# Patient Record
Sex: MALE | Race: WHITE | NOT HISPANIC OR LATINO | URBAN - METROPOLITAN AREA
[De-identification: names, ages, dates, MRNs, and addresses within clinical notes are randomized per-mention and may not be internally consistent; named-entity substitution may affect disease eponyms.]

---

## 2017-11-03 ENCOUNTER — OUTPATIENT (OUTPATIENT)
Dept: OUTPATIENT SERVICES | Age: 32
LOS: 1 days | Discharge: HOME | End: 2017-11-03

## 2017-11-06 ENCOUNTER — APPOINTMENT (OUTPATIENT)
Dept: PEDIATRIC CARDIOLOGY | Facility: CLINIC | Age: 32
End: 2017-11-06
Payer: COMMERCIAL

## 2017-11-06 VITALS
OXYGEN SATURATION: 100 % | DIASTOLIC BLOOD PRESSURE: 72 MMHG | WEIGHT: 141.1 LBS | BODY MASS INDEX: 24.09 KG/M2 | HEART RATE: 62 BPM | SYSTOLIC BLOOD PRESSURE: 119 MMHG | HEIGHT: 63.98 IN

## 2017-11-06 PROCEDURE — 93000 ELECTROCARDIOGRAM COMPLETE: CPT

## 2017-11-06 PROCEDURE — 99215 OFFICE O/P EST HI 40 MIN: CPT | Mod: 25

## 2017-11-06 PROCEDURE — 93320 DOPPLER ECHO COMPLETE: CPT

## 2017-11-06 PROCEDURE — 93303 ECHO TRANSTHORACIC: CPT

## 2017-11-06 PROCEDURE — 93325 DOPPLER ECHO COLOR FLOW MAPG: CPT

## 2018-09-28 ENCOUNTER — APPOINTMENT (OUTPATIENT)
Dept: PEDIATRIC CARDIOLOGY | Facility: CLINIC | Age: 33
End: 2018-09-28
Payer: COMMERCIAL

## 2018-09-28 VITALS
RESPIRATION RATE: 16 BRPM | OXYGEN SATURATION: 100 % | SYSTOLIC BLOOD PRESSURE: 104 MMHG | DIASTOLIC BLOOD PRESSURE: 65 MMHG | WEIGHT: 136.25 LBS | BODY MASS INDEX: 23.55 KG/M2 | HEART RATE: 54 BPM | HEIGHT: 63.78 IN

## 2018-09-28 PROCEDURE — 93320 DOPPLER ECHO COMPLETE: CPT

## 2018-09-28 PROCEDURE — 93325 DOPPLER ECHO COLOR FLOW MAPG: CPT

## 2018-09-28 PROCEDURE — 93303 ECHO TRANSTHORACIC: CPT

## 2018-10-03 ENCOUNTER — APPOINTMENT (OUTPATIENT)
Dept: PULMONOLOGY | Facility: CLINIC | Age: 33
End: 2018-10-03
Payer: COMMERCIAL

## 2018-10-03 VITALS
SYSTOLIC BLOOD PRESSURE: 123 MMHG | RESPIRATION RATE: 16 BRPM | HEART RATE: 76 BPM | WEIGHT: 133 LBS | HEIGHT: 63.78 IN | DIASTOLIC BLOOD PRESSURE: 75 MMHG | TEMPERATURE: 98 F | BODY MASS INDEX: 22.99 KG/M2 | OXYGEN SATURATION: 98 %

## 2018-10-03 DIAGNOSIS — G47.33 OBSTRUCTIVE SLEEP APNEA (ADULT) (PEDIATRIC): ICD-10-CM

## 2018-10-03 PROCEDURE — 99244 OFF/OP CNSLTJ NEW/EST MOD 40: CPT

## 2018-11-12 ENCOUNTER — APPOINTMENT (OUTPATIENT)
Dept: SLEEP CENTER | Facility: CLINIC | Age: 33
End: 2018-11-12
Payer: COMMERCIAL

## 2018-11-12 ENCOUNTER — OUTPATIENT (OUTPATIENT)
Dept: OUTPATIENT SERVICES | Facility: HOSPITAL | Age: 33
LOS: 1 days | End: 2018-11-12
Payer: COMMERCIAL

## 2018-11-12 PROCEDURE — G0399: CPT | Mod: 26

## 2018-11-12 PROCEDURE — G0399: CPT

## 2018-11-16 DIAGNOSIS — G47.33 OBSTRUCTIVE SLEEP APNEA (ADULT) (PEDIATRIC): ICD-10-CM

## 2019-10-25 ENCOUNTER — APPOINTMENT (OUTPATIENT)
Dept: PEDIATRIC CARDIOLOGY | Facility: CLINIC | Age: 34
End: 2019-10-25
Payer: COMMERCIAL

## 2019-10-25 VITALS
BODY MASS INDEX: 22.14 KG/M2 | HEART RATE: 62 BPM | RESPIRATION RATE: 20 BRPM | HEIGHT: 63.78 IN | OXYGEN SATURATION: 98 % | DIASTOLIC BLOOD PRESSURE: 68 MMHG | SYSTOLIC BLOOD PRESSURE: 119 MMHG | WEIGHT: 128.09 LBS

## 2019-10-25 PROCEDURE — 93320 DOPPLER ECHO COMPLETE: CPT

## 2019-10-25 PROCEDURE — 93325 DOPPLER ECHO COLOR FLOW MAPG: CPT

## 2019-10-25 PROCEDURE — 93303 ECHO TRANSTHORACIC: CPT

## 2019-10-29 NOTE — CONSULT LETTER
[Today's Date] : [unfilled] [] : : ~~ [Name] : Name: [unfilled] [Today's Date:] : [unfilled] [Dear  ___:] : Dear Dr. [unfilled]: [Consult] : I had the pleasure of evaluating your patient, [unfilled]. My full evaluation follows. [Consult - Multiple Provider] : Thank you very much for allowing us to participate in the care of this patient. If you have any questions, please do not hesitate to contact us. [Sincerely,] : Sincerely, [FreeTextEntry4] : Dr. Jaki Lam [FreeTextEntry5] : [address] [FreeTextEntry6] : [place]  [address] [de-identified] : Flora Gibbons, MSN, CPNP-AC, PC\par Pediatric Cardiology, Adult Congenital Cardiology\par [facility]\par \par Joe Padilla MD\par Pediatric Cardiology\par Adult Congenital Heart Disease\par  of Pediatrics\par [employer] [place] [facility] [employer] [facility]\par \par Flora Gibbons MSN, CPNP-AC, PC\par Pediatric Cardiology, Adult Congenital Cardiology\par [facility]\par

## 2019-10-29 NOTE — HISTORY OF PRESENT ILLNESS
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 His surgery was done by Dr. Mao in 1986 at [facility]. As you know, Kevon also carries a diagnosis of Trisomy 21.\par \par Kevon cardiac interim history is unremarkable. he continues to attend his day program without any limitations. The cardiovascular review of systems is unremarkable. Specifically, there are no complaints of chest pain, palpitations, shortness of breath, peripheral edema, dizziness or syncope. He underwent a sleep study following his near syncopal event last year which was negative for any obstructive sleep apnea. He has routine blood work which was normal with the exception of markers for celiac disease, which runs in the family.  He is pending a potential EGD with biopsies.\par \par He continues on Synthroid fro his history of hypothyroidism. He gets routine dental care and received an annual flu shot for the upcoming flu season.His mother maintains legal guardianship of him.

## 2019-10-29 NOTE — REVIEW OF SYSTEMS
[Feeling Poorly] : not feeling poorly (malaise) [Cyanosis] : no cyanosis [Edema] : no edema [Chest Pain] : no chest pain or discomfort [Exercise Intolerance] : no persistence of exercise intolerance [Palpitations] : no palpitations [Fast HR] : no tachycardia [Tachypnea] : not tachypneic [Wheezing] : no wheezing [Shortness Of Breath] : not expressed as feeling short of breath [Abdominal Pain] : no abdominal pain [Fainting (Syncope)] : no fainting [Headache] : no headache [Dizziness] : no dizziness [Easy Bruising] : no tendency for easy bruising [Easy Bleeding] : no ~M tendency for easy bleeding [Sleep Disturbances] : ~T no sleep disturbances [Depression] : no depression [Anxiety] : no anxiety [Heat/Cold Intolerance] : no temperature intolerance

## 2019-10-29 NOTE — PHYSICAL EXAM
[General Appearance - Alert] : alert [General Appearance - In No Acute Distress] : in no acute distress [General Appearance - Well Developed] : well developed [General Appearance - Well-Appearing] : well appearing [Facies] : the head and face were normal in appearance [Down Syndrome] : Down Syndrome [Sclera] : the conjunctiva were normal [Examination Of The Oral Cavity] : mucous membranes were moist and pink [Respiration, Rhythm And Depth] : normal respiratory rhythm and effort [Auscultation Breath Sounds / Voice Sounds] : breath sounds clear to auscultation bilaterally [No Cough] : no cough [Sternotomy] : sternotomy [Apical Impulse] : quiet precordium with normal apical impulse [Heart Rate And Rhythm] : normal heart rate and rhythm [Heart Sounds] : normal S1 and S2 [Heart Sounds Gallop] : no gallops [Heart Sounds Pericardial Friction Rub] : no pericardial rub [Heart Sounds Click] : no clicks [Arterial Pulses] : normal upper and lower extremity pulses with no pulse delay [Edema] : no edema [Capillary Refill Test] : normal capillary refill [Systolic] : systolic [II] : a grade 2/6 [LMSB] : LMSB  [Holosystolic] : holosystolic [Bowel Sounds] : normal bowel sounds [Abdomen Soft] : soft [Nondistended] : nondistended [Abdomen Tenderness] : non-tender [] : no hepato-splenomegaly [Musculoskeletal - Swelling] : no joint swelling seen [Nail Clubbing] : no clubbing  or cyanosis of the fingers [Skin Color & Pigmentation] : normal skin color and pigmentation [Demonstrated Behavior - Infant Nonreactive To Parents] : interactive [FreeTextEntry1] : tattoos to both arms

## 2019-10-29 NOTE — CARDIOLOGY SUMMARY
[Today's Date] : [unfilled] [FreeTextEntry1] : normal sinus rhythm\par right bundle branch block (QRSd 124 ms)\par left axis deviation\par minimal voltage criteria for LVH\par when compared to prior, no significant change is seen [FreeTextEntry2] : mild right and mild left atrioventricular valve regurgitation\par estimated RV pressures ~ 37 mmHg\par trivial peripatch VSD\par mild dilated aortic root, mild AI\par mild PS- peak 22/mean 14 mmHg\par normal biventricular function

## 2020-02-15 ENCOUNTER — APPOINTMENT (OUTPATIENT)
Dept: ORTHOPEDIC SURGERY | Facility: CLINIC | Age: 35
End: 2020-02-15

## 2020-02-20 ENCOUNTER — APPOINTMENT (OUTPATIENT)
Dept: NEUROSURGERY | Facility: CLINIC | Age: 35
End: 2020-02-20
Payer: COMMERCIAL

## 2020-02-20 VITALS
SYSTOLIC BLOOD PRESSURE: 118 MMHG | HEIGHT: 64 IN | BODY MASS INDEX: 22.36 KG/M2 | DIASTOLIC BLOOD PRESSURE: 64 MMHG | WEIGHT: 131 LBS | HEART RATE: 63 BPM | OXYGEN SATURATION: 98 %

## 2020-02-20 DIAGNOSIS — M53.2X1 SPINAL INSTABILITIES, OCCIPITO-ATLANTO-AXIAL REGION: ICD-10-CM

## 2020-02-20 PROCEDURE — 99204 OFFICE O/P NEW MOD 45 MIN: CPT

## 2020-02-21 PROBLEM — M53.2X1 ATLANTO-AXIAL INSTABILITY: Status: ACTIVE | Noted: 2020-02-21

## 2020-02-21 RX ORDER — LEVOTHYROXINE SODIUM 0.05 MG/1
50 TABLET ORAL
Refills: 0 | Status: ACTIVE | COMMUNITY

## 2020-02-26 NOTE — HISTORY OF PRESENT ILLNESS
[> 3 months] : more  than 3 months [FreeTextEntry1] : C1-2 abnormality on x-ray [de-identified] : Mr. 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  The patient denies neck pain.  Per his mother, he has never complained of neck pain.  He does not have numbness, tingling, or weakness of the extremities.  He is able to ambulate steadily and has not had any unexplained falls.

## 2020-02-26 NOTE — PHYSICAL EXAM
[General Appearance - Alert] : alert [General Appearance - In No Acute Distress] : in no acute distress [General Appearance - Well Nourished] : well nourished [Oriented To Time, Place, And Person] : oriented to person, place, and time [Impaired Insight] : insight and judgment were intact [Affect] : the affect was normal [Person] : oriented to person [Place] : oriented to place [Time] : oriented to time [Short Term Intact] : short term memory intact [Concentration Intact] : normal concentrating ability [Fluency] : fluency intact [Comprehension] : comprehension intact [Cranial Nerves Optic (II)] : visual acuity intact bilaterally,  pupils equal round and reactive to light [Cranial Nerves Oculomotor (III)] : extraocular motion intact [Cranial Nerves Facial (VII)] : face symmetrical [Cranial Nerves Trigeminal (V)] : facial sensation intact symmetrically [Cranial Nerves Glossopharyngeal (IX)] : tongue and palate midline [Cranial Nerves Accessory (XI - Cranial And Spinal)] : head turning and shoulder shrug symmetric [Cranial Nerves Hypoglossal (XII)] : there was no tongue deviation with protrusion [Sensation Tactile Decrease] : light touch was intact [Motor Strength] : muscle strength was normal in all four extremities [Motor Tone] : muscle tone was normal in all four extremities [Abnormal Walk] : normal gait [Sensation Pain / Temperature Decrease] : pain and temperature was intact [Balance] : balance was intact [No Visual Abnormalities] : no visible abnormailities [Normal] : normal [Over the Past 2 Weeks, Have You Felt Down, Depressed, or Hopeless?] : 1.) Over the past 2 weeks, have you felt down, depressed, or hopeless? No [Over the Past 2 Weeks, Have You Felt Little Interest or Pleasure Doing Things?] : 2.) Over the past 2 weeks, have you felt little interest or pleasure doing things? No [___] : absent on the right [___] : absent on the left [FreeTextEntry9] : no Olaf's

## 2020-02-26 NOTE — DATA REVIEWED
[de-identified] : X-ray of the cervical spine shows alteration of the normal C1-2 alignment.  An official report is not available for review.

## 2020-02-26 NOTE — ASSESSMENT
[FreeTextEntry1] : Mr. Shetty presents for initial neurosurgical evaluation with his mother.  I have explained that an MRI, CT, and flexion-extension images would likely be necessary to risk stratify the patient for endoscopy from a neurosurgical/cervical spine perspective.  The patient's mother does not wish to pursue endoscopy and intends to switch her son to a gluten free diet.  I would be happy to see the patient back on a prn basis.  His mother knows to call the office with any new or concerning symptoms at any time.

## 2020-02-26 NOTE — REASON FOR VISIT
[New Patient Visit] : a new patient visit [Parent] : parent [Referred By: _________] : Patient was referred by CARLINE [FreeTextEntry1] : atlantoaxial instability

## 2020-04-10 ENCOUNTER — APPOINTMENT (OUTPATIENT)
Dept: GASTROENTEROLOGY | Facility: CLINIC | Age: 35
End: 2020-04-10

## 2020-07-01 ENCOUNTER — APPOINTMENT (OUTPATIENT)
Dept: GASTROENTEROLOGY | Facility: CLINIC | Age: 35
End: 2020-07-01

## 2020-11-06 DIAGNOSIS — Z98.890 OTHER SPECIFIED POSTPROCEDURAL STATES: ICD-10-CM

## 2020-11-13 ENCOUNTER — APPOINTMENT (OUTPATIENT)
Dept: PEDIATRIC CARDIOLOGY | Facility: CLINIC | Age: 35
End: 2020-11-13
Payer: COMMERCIAL

## 2020-11-13 VITALS
RESPIRATION RATE: 20 BRPM | SYSTOLIC BLOOD PRESSURE: 125 MMHG | HEART RATE: 62 BPM | HEIGHT: 62.99 IN | DIASTOLIC BLOOD PRESSURE: 81 MMHG | OXYGEN SATURATION: 99 % | WEIGHT: 123.68 LBS | BODY MASS INDEX: 21.91 KG/M2

## 2020-11-13 PROCEDURE — 93325 DOPPLER ECHO COLOR FLOW MAPG: CPT

## 2020-11-13 PROCEDURE — 93320 DOPPLER ECHO COMPLETE: CPT

## 2020-11-13 PROCEDURE — 93303 ECHO TRANSTHORACIC: CPT

## 2020-11-13 PROCEDURE — 99072 ADDL SUPL MATRL&STAF TM PHE: CPT

## 2020-11-16 NOTE — DISCUSSION/SUMMARY
[Needs SBE Prophylaxis] : [unfilled]  needs bacterial endocarditis prophylaxis. SBE prophylaxis is indicated for dental and invasive ENT procedures. (Circulation. 2007; 116: 6499-0282) [Influenza vaccine is recommended] : Influenza vaccine is recommended [FreeTextEntry1] : In summary, Kevon is a 35 year old with Trisomy 21 and a complete AV canal for which he underwent complete repair at age 1 years.  His echocardiograms continues to show a durable repair.  There is no residual atrial communication, a trivial sue-patch VSD with a 64 mmHg, and mild right/left atrioventricular valve regurgitation. He is clinically unchanged from his visit one year ago. He has gotten his flu shot and has a dental appointment coming up next week.  He is having blood work done next week to flow up on his lipid panel and will send those results so we may scan them into our chart for our records.\par \par We will continue to follow him annually but remain available to see him sooner if any clinical concerns arise.

## 2020-11-16 NOTE — HISTORY OF PRESENT ILLNESS
[FreeTextEntry1] : We had the pleasure of seeing Kevon for follow up in the [facility] of [facility] on November 13, 2020. 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 Mao. Kevon also carries a diagnosis of Trisomy 21.\par \par Kevon has done well during COVID. His day program had closed during COVID and he never went back. He has kept busy with drawing and walking on the treadmill 30 minutes 3-4 times weekly. From a cardiovascular standpoint his cardiovascular history is unremarkable. his cardiovascular review of systems is completely unremarkable. Specifically, he has no complaints of chest pain, palpitations, shortness of breath, peripheral edema, dizziness or syncope.\par Specifically, he has no complaints of chest pain, palpitations, shortness of breath, peripheral edema, dizziness or syncope.\par \par He remains on Synthroid. He had labs done that revealed he had Celiac Disease and that has been managed with a Gluten Free diet.\par \par \par

## 2020-11-16 NOTE — REASON FOR VISIT
[Follow-Up] : a follow-up visit for [Complete Atrioventricular Canal] : a complete atrioventricular canal [Trisomy 21 (Down Syndrome)] : Trisomy 21  [Mother] : mother [FreeTextEntry3] : s/p AV canal repair

## 2020-11-16 NOTE — CARDIOLOGY SUMMARY
[Today's Date] : [unfilled] [FreeTextEntry1] : NSR, ventricular rate 62 bpm, RBBB, left axis [FreeTextEntry2] : Summary:  \par 1.Complete atrioventricular canal defect.\par 2.Status post complete common atrioventricular valve canal repair.\par 3.No residual interatrial shunt identified.\par 4.Mild right and mild to moderate left atrioventricular valve regurgitation.\par 5.Small sue-patch ventricular septal defect identified.\par 6.The tricuspid regurgitation gradient suggests mildly elevated right ventricular pressures.\par 7.Right ventricular systolic pressure estimate = 35.5 mmHg (estimated right atrial pressure of 5 mmHg).\par 8.Qualitatively normal left ventricular systolic function.\par 9.Mild pulmonary valve stenosis.\par 10.Peak pulmonary valve gradient = 24.2 mmHg (mean grad = 18.0 mmHg).\par 11.Trivial pulmonary valve regurgitation.\par 12.Mildly dilated aortic root.\par 13.Dilated aortic sinotubular junction.\par 14.Trivial aortic valve regurgitation.\par 15.No pericardial effusion\par

## 2020-11-16 NOTE — CONSULT LETTER
[Consult - Multiple Provider] : Thank you very much for allowing us to participate in the care of this patient. If you have any questions, please do not hesitate to contact us. [Sincerely,] : Sincerely, [Today's Date] : [unfilled] [Name] : Name: [unfilled] [] : : ~~ [Today's Date:] : [unfilled] [Dear  ___:] : Dear Dr. [unfilled]: [Consult] : I had the pleasure of evaluating your patient, [unfilled]. My full evaluation follows. [FreeTextEntry4] : Dr. Jaki Lam [FreeTextEntry5] : [address] [FreeTextEntry6] : [place]  [address] [de-identified] : Flora Gibbons, MSN, CPNP-AC, PC\par Pediatric Cardiology, Adult Congenital Cardiology\par [facility]\par \par Kris Ceelste MD, TAM\par Medical Director, Adult Congenital Heart Program\par Professor of Pediatrics\par [employer] [facility] [employer] [facility]\par

## 2020-11-16 NOTE — REVIEW OF SYSTEMS
[Feeling Poorly] : not feeling poorly (malaise) [Fever] : no fever [Cyanosis] : no cyanosis [Edema] : no edema [Exercise Intolerance] : no persistence of exercise intolerance [Fast HR] : no tachycardia [Cough] : no cough [Shortness Of Breath] : not expressed as feeling short of breath [Fainting (Syncope)] : no fainting [Headache] : no headache [Dizziness] : no dizziness [Easy Bruising] : no tendency for easy bruising [Easy Bleeding] : no ~M tendency for easy bleeding [Sleep Disturbances] : ~T no sleep disturbances [Anxiety] : no anxiety

## 2020-11-16 NOTE — PHYSICAL EXAM
[General Appearance - Alert] : alert [General Appearance - Well Nourished] : well nourished [Down Syndrome] : Down Syndrome [Sclera] : the conjunctiva were normal [Auscultation Breath Sounds / Voice Sounds] : breath sounds clear to auscultation bilaterally [No Cough] : no cough [Sternotomy] : sternotomy [Well-Healed] : well-healed [Unremarkable] : unremarkable [Apical Impulse] : quiet precordium with normal apical impulse [Heart Rate And Rhythm] : normal heart rate and rhythm [Heart Sounds] : normal S1 and S2 [2+] : right 2+ [1+] : left 1+ [___ +] : [unfilled]U+ pitting edema to L ankle [Systolic] : systolic [II] : a grade 2/6 [LLSB] : LLSB  [LMSB] : LMSB  [Holosystolic] : holosystolic [Blowing] : blowing [[place]] : the murmur was transmitted to the apex [Diastolic] : diastolic [Abdomen Soft] : soft [] : no hepato-splenomegaly [Nail Clubbing] : no clubbing  or cyanosis of the fingers [Musculoskeletal Exam: Normal Movement Of All Extremities] : normal movements of all extremities [Normal Station and Gait] : the gait and station were normal for the patient's age [Cervical Lymph Nodes Enlarged Anterior] : The anterior cervical nodes were normal [Cervical Lymph Nodes Enlarged Posterior] : The posterior cervical nodes were normal [Skin Turgor] : normal turgor [Demonstrated Behavior - Infant Nonreactive To Parents] : interactive [FreeTextEntry1] : I/IV diastolic rumble at LLSB

## 2021-12-17 ENCOUNTER — APPOINTMENT (OUTPATIENT)
Dept: PEDIATRIC CARDIOLOGY | Facility: CLINIC | Age: 36
End: 2021-12-17
Payer: MEDICAID

## 2021-12-17 VITALS
WEIGHT: 122.58 LBS | SYSTOLIC BLOOD PRESSURE: 102 MMHG | HEART RATE: 62 BPM | OXYGEN SATURATION: 100 % | RESPIRATION RATE: 20 BRPM | BODY MASS INDEX: 21.99 KG/M2 | DIASTOLIC BLOOD PRESSURE: 55 MMHG | HEIGHT: 62.6 IN

## 2021-12-17 PROCEDURE — 93303 ECHO TRANSTHORACIC: CPT

## 2021-12-17 PROCEDURE — 99214 OFFICE O/P EST MOD 30 MIN: CPT

## 2021-12-17 PROCEDURE — 93000 ELECTROCARDIOGRAM COMPLETE: CPT

## 2021-12-17 PROCEDURE — 93320 DOPPLER ECHO COMPLETE: CPT

## 2021-12-17 PROCEDURE — 93325 DOPPLER ECHO COLOR FLOW MAPG: CPT

## 2021-12-17 NOTE — PHYSICAL EXAM
[General Appearance - Alert] : alert [Down Syndrome] : Down Syndrome [Sclera] : the sclera were normal [Examination Of The Oral Cavity] : mucous membranes were moist and pink [Auscultation Breath Sounds / Voice Sounds] : breath sounds clear to auscultation bilaterally [No Cough] : no cough [Well-Healed] : well-healed [Unremarkable] : unremarkable [Apical Impulse] : quiet precordium with normal apical impulse [Heart Rate And Rhythm] : normal heart rate and rhythm [Heart Sounds] : normal S1 and S2 [Edema] : no edema [Systolic] : systolic [LMSB] : LMSB  [[place]] : the murmur was transmitted to the apex [Nail Clubbing] : no clubbing  or cyanosis of the fingers [Motor Tone] : normal muscle strength and tone [Skin Color & Pigmentation] : normal skin color and pigmentation [Demonstrated Behavior - Infant Nonreactive To Parents] : interactive [III] : a grade 3/6   [Diastolic] : diastolic [I] : a grade 1/4  [LLSB] : LLSB  [Rumbling] : rumbling [Abdomen Soft] : soft [Palpable___cm below the RCM] : palpable [unfilled] cm below the right costal margin

## 2021-12-21 NOTE — DISCUSSION/SUMMARY
[FreeTextEntry1] : In summary, Kevon is a 36 year old with Trisomy 21 and a complete AV canal for which he underwent complete repair at age 1 years. His echocardiogram remains unchanged, demonstrating  a durable repair. There is no residual atrial communication, a trivial restrictive sue-patch VSD, and mild right/left atrioventricular valve regurgitation. He is clinically unchanged from his visit one year ago. \par \par We will continue to follow him annually but remain available to see him sooner if any clinical concerns arise [Needs SBE Prophylaxis] : [unfilled] does not need bacterial endocarditis prophylaxis

## 2021-12-21 NOTE — HISTORY OF PRESENT ILLNESS
[FreeTextEntry1] : We had the pleasure of seeing Kevon for follow up in the Adult [facility] Program of [facility] on December 17, 2021. Kevon is a 36 year old male with a complete common AV canal who underwent complete repair in 1986 by Dr. Mao. Kevon also carries a diagnosis of Trisomy 21.\par \par Kevon has done well during COVID. He has been vaccinated with the Pfizer vaccine. He has not had the booster. His parents have retired and so he has "retired" from his day program as well. He has kept busy with family,  drawing and walking on the treadmill 30 minutes 3-4 times weekly. From a cardiovascular standpoint his cardiovascular history is unremarkable.\par \par His cardiovascular review of systems is completely unremarkable. Specifically, he has no complaints of chest pain, palpitations, shortness of breath, peripheral edema, dizziness or syncope. Specifically, he has no complaints of chest pain, palpitations, shortness of breath, peripheral edema, dizziness or syncope.\par \par He remains on Synthroid. His Celiac Disease  has been managed with a Gluten Free diet.

## 2021-12-21 NOTE — CARDIOLOGY SUMMARY
[Today's Date] : [unfilled] [FreeTextEntry1] : Normal sinus rhythm\par Left anterior fascicular block\par Right bundle branch block [FreeTextEntry2] : Summary:\par 1. Complete atrioventricular canal defect.\par 2. Status post complete common atrioventricular valve canal repair.\par 3. No residual interatrial shunt identified.\par 4. Mild right and moderate left atrioventricular valve regurgitation.\par 5. Mild pulmonary valve stenosis.\par 6. Trivial pulmonary valve regurgitation.\par 7. Right ventricular systolic pressure estimate = 40.4 mmHg (estimated right atrial pressure of 5 mmHg).\par 8. The tricuspid regurgitation gradient suggests mildly elevated right ventricular pressures.\par 9. Qualitatively normal left ventricular systolic function.\par 10. Normal right ventricular morphology.\par 11. Qualitatively normal right ventricular systolic function.\par 12. Small peripatch VSD with left to right flow and a peak gradient of ~58 mm Hg (incomplete Doppler).\par 13. Aortic valve morphology was not well visualized.\par 14. Trivial aortic valve regurgitation.\par 15. Dilated aortic sinotubular junction.\par 16. Mildly dilated aortic root.\par 17. No pericardial effusion\par

## 2021-12-21 NOTE — CONSULT LETTER
[Today's Date] : [unfilled] [Name] : Name: [unfilled] [] : : ~~ [Today's Date:] : [unfilled] [Dear  ___:] : Dear Dr. [unfilled]: [Consult] : I had the pleasure of evaluating your patient, [unfilled]. My full evaluation follows. [Consult - Multiple Provider] : Thank you very much for allowing us to participate in the care of this patient. If you have any questions, please do not hesitate to contact us. [Sincerely,] : Sincerely, [FreeTextEntry4] : Dr. Jaki Lam [FreeTextEntry5] : [address] [FreeTextEntry6] : [place]  [address] [de-identified] : Flora Gibbons, MSN, CPNP-AC, PC\par Pediatric Cardiology, Adult Congenital Cardiology\par [facility]\par [facility]\par \par Kris Celeste MD, TAM\par Medical Director, Adult Congenital Heart Program\par Professor of Pediatrics\par [employer] [facility] [employer] at [facility]\par

## 2021-12-21 NOTE — REVIEW OF SYSTEMS
[Feeling Poorly] : not feeling poorly (malaise) [Cyanosis] : no cyanosis [Exercise Intolerance] : no persistence of exercise intolerance [Palpitations] : no palpitations [Orthopnea] : no orthopnea [Fast HR] : no tachycardia [Cough] : no cough [Shortness Of Breath] : not expressed as feeling short of breath [Decrease In Appetite] : appetite not decreased [Fainting (Syncope)] : no fainting [Headache] : no headache [Dizziness] : no dizziness [Easy Bruising] : no tendency for easy bruising [Easy Bleeding] : no ~M tendency for easy bleeding [Sleep Disturbances] : ~T no sleep disturbances [Depression] : no depression [Anxiety] : no anxiety [Heat/Cold Intolerance] : no temperature intolerance

## 2022-05-30 ENCOUNTER — NON-APPOINTMENT (OUTPATIENT)
Age: 37
End: 2022-05-30

## 2022-12-16 ENCOUNTER — APPOINTMENT (OUTPATIENT)
Dept: PEDIATRIC CARDIOLOGY | Facility: CLINIC | Age: 37
End: 2022-12-16
Payer: MEDICARE

## 2022-12-16 VITALS
WEIGHT: 120.15 LBS | SYSTOLIC BLOOD PRESSURE: 98 MMHG | OXYGEN SATURATION: 98 % | DIASTOLIC BLOOD PRESSURE: 62 MMHG | RESPIRATION RATE: 20 BRPM | BODY MASS INDEX: 21.56 KG/M2 | HEIGHT: 62.6 IN | HEART RATE: 63 BPM

## 2022-12-16 PROCEDURE — 93325 DOPPLER ECHO COLOR FLOW MAPG: CPT

## 2022-12-16 PROCEDURE — 93320 DOPPLER ECHO COMPLETE: CPT

## 2022-12-16 PROCEDURE — 99213 OFFICE O/P EST LOW 20 MIN: CPT | Mod: 25

## 2022-12-16 PROCEDURE — 93000 ELECTROCARDIOGRAM COMPLETE: CPT

## 2022-12-16 PROCEDURE — 93303 ECHO TRANSTHORACIC: CPT

## 2022-12-16 NOTE — PHYSICAL EXAM
[General Appearance - Alert] : alert [Down Syndrome] : Down Syndrome [Sclera] : the sclera were normal [Auscultation Breath Sounds / Voice Sounds] : breath sounds clear to auscultation bilaterally [Sternotomy] : sternotomy [Well-Healed] : well-healed [Apical Impulse] : quiet precordium with normal apical impulse [Heart Rate And Rhythm] : normal heart rate and rhythm [Heart Sounds] : normal S1 and S2 [Systolic] : systolic [III] : a grade 3/6   [LMSB] : LMSB  [[place]] : the murmur was transmitted to the apex [Diastolic] : diastolic [I] : a grade 1/4  [LLSB] : LLSB  [Abdomen Soft] : soft [] : no hepato-splenomegaly [Palpable___cm below the RCM] : palpable [unfilled] cm below the right costal margin [Nail Clubbing] : no clubbing  or cyanosis of the fingers [Skin Color & Pigmentation] : normal skin color and pigmentation [Demonstrated Behavior - Infant Nonreactive To Parents] : interactive

## 2022-12-16 NOTE — REASON FOR VISIT
[Follow-Up] : a follow-up visit for [Complete Atrioventricular Canal] : a complete atrioventricular canal [Patient] : patient [Mother] : mother

## 2022-12-22 NOTE — DISCUSSION/SUMMARY
[FreeTextEntry1] : In summary, Kevon is a 37 year old with Trisomy 21 and a complete AV canal for which he underwent complete repair at age 1 years. His echocardiogram remains unchanged, demonstrating a durable repair. There is no residual atrial communication, a trivial restrictive sue-patch VSD, and mild right/left atrioventricular valve regurgitation. He is clinically unchanged from his visit one year ago. \par \par We will continue to follow him annually but remain available to see him sooner if any clinical concerns arise  [Needs SBE Prophylaxis] : [unfilled] does not need bacterial endocarditis prophylaxis [PE + No Varsity Sports or Strenuous Activity] : [unfilled] may participate in the physical education program, WITH RESTRICTION from all varsity sports and from excessively stressful activities such as rope climbing, weight lifting, sustained running (i.e. laps) and fitness testing. Must be allowed to rest when tired. [Influenza vaccine is recommended] : Influenza vaccine is recommended

## 2022-12-22 NOTE — CARDIOLOGY SUMMARY
[Today's Date] : [unfilled] [FreeTextEntry1] : NSR, ventricular rate 63 bpm, RBBB, left anterior fascicular block [FreeTextEntry2] : Summary:\par 1. Complete atrioventricular canal defect.\par 2. Status post complete common atrioventricular valve canal repair.\par 3. Mild right and moderate left atrioventricular valve regurgitation.\par 4. No residual interatrial shunt identified.\par 5. Small peripatch VSD with left to right flow and a peak gradient of ~58 mm Hg (incomplete Doppler).\par 6. Trivial aortic valve regurgitation.\par 7. Dilated aortic sinotubular junction.\par 8. Mildly dilated aortic root.\par 9. Aortic sinuses of Valsalva dimension (systole) = 3.3 cm (z = 2.52).\par 10. Normal left ventricular size, morphology and systolic function.\par 11. RVOT flow acceleration starts below the valve level (image 88). Suggestion of prominent muscle bundles. However, this area is poorly visualized.\par 12. Mild pulmonary valve stenosis.\par 13. Peak pulmonary valve gradient = 35.8 mmHg (mean grad = 19.3 mmHg).\par 14. Trivial pulmonary valve regurgitation.\par 15. Qualitatively normal right ventricular systolic function.\par 16. Dyskinetic ventricular septal motion.\par 17. The tricuspid regurgitation gradient suggests mildly elevated right ventricular pressures.\par 18. Right ventricular systolic pressure estimate = 35.1 mmHg (estimated right atrial pressure of 5 mmHg).\par 19. No pericardial effusion.

## 2022-12-22 NOTE — HISTORY OF PRESENT ILLNESS
[FreeTextEntry1] : We had the pleasure of seeing Kevon for follow up in the Adult Cong[facility]ital [facility] Program of [facility]. 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 Mao. Kevon also carries a diagnosis of Trisomy 21.\par \par Kevon has done well during COVID. He has been vaccinated with the Pfizer vaccine. He has not had the booster. He is home with his parents and no longer attends a day program.. He has kept busy with family, drawing and walking on the treadmill 30 minutes 3-4 times weekly. From a cardiovascular standpoint his cardiovascular history is unremarkable.\par \par His cardiovascular review of systems is completely unremarkable. Specifically, he has no complaints of chest pain, palpitations, shortness of breath, peripheral edema, dizziness or syncope. Specifically, he has no complaints of chest pain, palpitations, shortness of breath, peripheral edema, dizziness or syncope.\par \par He remains on Synthroid. His Celiac Disease has been managed with a Gluten Free diet

## 2022-12-22 NOTE — REVIEW OF SYSTEMS
[Feeling Poorly] : not feeling poorly (malaise) [Cyanosis] : no cyanosis [Exercise Intolerance] : no persistence of exercise intolerance [Palpitations] : no palpitations [Orthopnea] : no orthopnea [Abdominal Pain] : no abdominal pain [Fainting (Syncope)] : no fainting [Headache] : no headache [Dizziness] : no dizziness [Easy Bruising] : no tendency for easy bruising [Easy Bleeding] : no ~M tendency for easy bleeding [Sleep Disturbances] : ~T no sleep disturbances

## 2023-12-20 ENCOUNTER — APPOINTMENT (OUTPATIENT)
Dept: PEDIATRIC CARDIOLOGY | Facility: CLINIC | Age: 38
End: 2023-12-20
Payer: MEDICARE

## 2023-12-20 VITALS
SYSTOLIC BLOOD PRESSURE: 96 MMHG | DIASTOLIC BLOOD PRESSURE: 58 MMHG | WEIGHT: 114.42 LBS | RESPIRATION RATE: 20 BRPM | BODY MASS INDEX: 20.27 KG/M2 | OXYGEN SATURATION: 99 % | HEART RATE: 63 BPM | HEIGHT: 62.99 IN

## 2023-12-20 PROCEDURE — 93303 ECHO TRANSTHORACIC: CPT

## 2023-12-20 PROCEDURE — 93325 DOPPLER ECHO COLOR FLOW MAPG: CPT

## 2023-12-20 PROCEDURE — 93320 DOPPLER ECHO COMPLETE: CPT

## 2023-12-20 PROCEDURE — 99203 OFFICE O/P NEW LOW 30 MIN: CPT | Mod: 95

## 2023-12-20 NOTE — PHYSICAL EXAM
[General Appearance - In No Acute Distress] : in no acute distress [Sternotomy] : sternotomy [Well-Healed] : well-healed [[place]] : the murmur was transmitted to the apex [Diastolic] : diastolic [I] : a grade 1/4  [LLSB] : LLSB

## 2023-12-20 NOTE — HISTORY OF PRESENT ILLNESS
[FreeTextEntry1] : We had the pleasure of seeing Kevon for follow up in the Adult Congenital Heart Program of [facility]. 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 Mao. Kevon also carries a diagnosis of Trisomy 21.  Kevon lives at home with his parents. He no longer attends a day program but keeps busy with his family at home. He has regular dental cleanings.  He has kept busy with family, drawing and walking on the treadmill 30 minutes 3-4 times weekly.  From a cardiovascular standpoint his cardiovascular history is unremarkable. Specifically, he has no complaints of chest pain, palpitations, shortness of breath, peripheral edema, dizziness or syncope. He can walk a flight of stairs.   He remains on Synthroid. His Celiac Disease has been managed with a Gluten Free diet  He is accompanied today by his father.  Of note, his brother has Marfan syndrome and underwent valve-sparing aortic root replacement in 2019.

## 2023-12-20 NOTE — CARDIOLOGY SUMMARY
[Today's Date] : [unfilled] [FreeTextEntry1] : NSR, ventricular rate 63 bpm, RBBB, left anterior fascicular block, bi-fascicular block

## 2023-12-20 NOTE — DISCUSSION/SUMMARY
[Needs SBE Prophylaxis] : [unfilled]  needs bacterial endocarditis prophylaxis. SBE prophylaxis is indicated for dental and invasive ENT procedures. (Circulation. 2007; 116: 4659-6479) [FreeTextEntry1] : In summary, Kevon is a 38 year old with Trisomy 21 and a complete AV canal for which he underwent complete repair at age 1 years. His echocardiogram remains unchanged, demonstrating a durable repair. There is no residual atrial communication, a trivial restrictive sue-patch VSD, and mild right/left atrioventricular valve regurgitation. He is clinically unchanged from his visit one year ago and feeling well.   We will mail a Holter for routine screeining.  SBE We also discussed good dental hygiene with routine dental visits every six months. As per ACC guidelines, endocarditis prophylaxis is recommended in those undergoing dental procedures with prosthetic cardiac valves, unrepaired cyanotic heart disease including palliative shunts and conduits, repaired CHD with residual defects at site of adjacent to patch or prosthetic device, repaired CHD with prosthetic material or device during the first 6 months after the procedure and in those patients with a history of infective endocarditis. Amoxicillin 2gm or another appropriate antibiotic should be taken 30 to 60 minutes prior to the procedure.  We will continue to follow him annually but remain available to see him sooner if any clinical concerns arise

## 2023-12-20 NOTE — CONSULT LETTER
[Today's Date] : [unfilled] [Name] : Name: [unfilled] [] : : ~~ [Today's Date:] : [unfilled] [Dear  ___:] : Dear Dr. [unfilled]: [Consult] : I had the pleasure of evaluating your patient, [unfilled]. My full evaluation follows. [Consult - Multiple Provider] : Thank you very much for allowing us to participate in the care of this patient. If you have any questions, please do not hesitate to contact us. [Sincerely,] : Sincerely, [FreeTextEntry4] : Dr. Jaki Lam [FreeTextEntry5] : [address] [FreeTextEntry6] : [place]  [address] [de-identified] : Flora Gibbons, MSN, CPNP-AC, [facility] Pediatric Cardiology, Adult Congenital Cardiology [facility] Physician [facility]  Lynsey Jordan MD, TAM Director, Adult Congenital Heart , High Risk Cardiovascular Obstetrics [facility] Physician [facility]  [address]: [phone] [facility] Office: [phone] [facility] Office: [phone]

## 2023-12-20 NOTE — REASON FOR VISIT
[Complete Atrioventricular Canal] : a complete atrioventricular canal [Patient] : patient [Mother] : mother [Other Location: e.g. School (Enter Location, City,State)___] : at [unfilled], at the time of the visit. [Other Location: e.g. Home (Enter Location, City,State)___] : at [unfilled] [Parents] : parents [Other:____] : [unfilled] [FreeTextEntry2] : Father [FreeTextEntry3] : Father [Initial Consultation] : an initial consultation for

## 2024-01-22 ENCOUNTER — APPOINTMENT (OUTPATIENT)
Dept: PEDIATRIC CARDIOLOGY | Facility: CLINIC | Age: 39
End: 2024-01-22
Payer: MEDICARE

## 2024-01-22 PROCEDURE — 93272 ECG/REVIEW INTERPRET ONLY: CPT

## 2024-03-17 ENCOUNTER — NON-APPOINTMENT (OUTPATIENT)
Age: 39
End: 2024-03-17

## 2024-03-18 ENCOUNTER — RESULT REVIEW (OUTPATIENT)
Age: 39
End: 2024-03-18

## 2024-03-21 ENCOUNTER — OUTPATIENT (OUTPATIENT)
Dept: OUTPATIENT SERVICES | Facility: HOSPITAL | Age: 39
LOS: 1 days | Discharge: HOME | End: 2024-03-21
Payer: MEDICARE

## 2024-03-21 ENCOUNTER — APPOINTMENT (OUTPATIENT)
Dept: WOUND CARE | Facility: HOSPITAL | Age: 39
End: 2024-03-21
Payer: MEDICARE

## 2024-03-21 VITALS
TEMPERATURE: 98.3 F | BODY MASS INDEX: 18.1 KG/M2 | RESPIRATION RATE: 18 BRPM | HEART RATE: 65 BPM | HEIGHT: 64 IN | SYSTOLIC BLOOD PRESSURE: 96 MMHG | OXYGEN SATURATION: 98 % | DIASTOLIC BLOOD PRESSURE: 58 MMHG | WEIGHT: 106 LBS

## 2024-03-21 DIAGNOSIS — L89.150 PRESSURE ULCER OF SACRAL REGION, UNSTAGEABLE: ICD-10-CM

## 2024-03-21 PROCEDURE — 99214 OFFICE O/P EST MOD 30 MIN: CPT

## 2024-03-21 PROCEDURE — G0463: CPT

## 2024-03-21 RX ORDER — SULFAMETHOXAZOLE AND TRIMETHOPRIM 400; 80 MG/1; MG/1
TABLET ORAL
Refills: 0 | Status: ACTIVE | COMMUNITY

## 2024-03-22 DIAGNOSIS — Z98.890 OTHER SPECIFIED POSTPROCEDURAL STATES: ICD-10-CM

## 2024-03-22 DIAGNOSIS — Q90.9 DOWN SYNDROME, UNSPECIFIED: ICD-10-CM

## 2024-03-22 DIAGNOSIS — L89.152 PRESSURE ULCER OF SACRAL REGION, STAGE 2: ICD-10-CM

## 2024-03-22 DIAGNOSIS — E03.9 HYPOTHYROIDISM, UNSPECIFIED: ICD-10-CM

## 2024-03-22 DIAGNOSIS — Z82.49 FAMILY HISTORY OF ISCHEMIC HEART DISEASE AND OTHER DISEASES OF THE CIRCULATORY SYSTEM: ICD-10-CM

## 2024-03-22 DIAGNOSIS — G47.33 OBSTRUCTIVE SLEEP APNEA (ADULT) (PEDIATRIC): ICD-10-CM

## 2024-03-22 DIAGNOSIS — Z83.2 FAMILY HISTORY OF DISEASES OF THE BLOOD AND BLOOD-FORMING ORGANS AND CERTAIN DISORDERS INVOLVING THE IMMUNE MECHANISM: ICD-10-CM

## 2024-03-22 NOTE — ASSESSMENT
[Verbal] : Verbal [Written] : Written [Family member] : Family member [Patient] : Patient [Caregiver] : Caregiver [Good - alert, interested, motivated] : Good - alert, interested, motivated [Demonstrates independently] : demonstrates independently [Skin Care] : skin care [Dressing changes] : dressing changes [Pressure relief] : pressure relief [Signs and symptoms of infection] : sign and symptoms of infection [Nutrition] : nutrition [How and When to Call] : how and when to call [Pain Management] : pain management [Home Health] : home health [Off-loading] : off-loading [Patient responsibility to plan of care] : patient responsibility to plan of care [] : Yes [Stable] : stable [Home] : Home [Ambulatory] : Ambulatory [Not Applicable - Long Term Care/Home Health Agency] : Long Term Care/Home Health Agency: Not Applicable [FreeTextEntry2] : dressing changes- wound care offloading infection prevention optimal nutrition discussed protein supplement for optimal wound healing  [FreeTextEntry3] : initial vistit [FreeTextEntry4] : follow up in two weeks. rx for medihoney  will request wound care supplies to be sent to pt's home.  [FreeTextEntry1] : Stage II sacral pressure ulcer, no acute infection.  ABDOMINAL PAIN

## 2024-03-22 NOTE — VITALS
[Pain related to present condition?] : The patient's  pain is related to present condition. [] : Yes [de-identified] : patient unable to give numeric value for pain, pt grimmaced and winced during assessment, [FreeTextEntry1] : patient's mother administered advil prior to visit.  [FreeTextEntry3] : sacrum  [FreeTextEntry5] : initial visit, medication reconciled.  [FreeTextEntry2] : pressure on the affected area.  [FreeTextEntry4] : encouraged offloading.

## 2024-03-22 NOTE — PLAN
[FreeTextEntry1] : Emanuel Huffman Border, offloading, return to office in two weeks. 35 minutes spent for patient care and medical decision making.

## 2024-03-22 NOTE — PHYSICAL EXAM
[Normal Breath Sounds] : Normal breath sounds [Normal Heart Sounds] : normal heart sounds [Alert] : alert [Oriented to Person] : oriented to person [Calm] : calm [de-identified] : Within normal limits. [de-identified] : Well-developed, Well-nourished in no acute distress. [de-identified] : Within normal limits. [de-identified] : Stage II sacral pressure ulcer, base is red and viable, no acute infection, periwound skin is intact with no cellulitis.  [de-identified] : Within normal limits. [FreeTextEntry1] : Right upper buttock/ coccyx fold  [FreeTextEntry2] : 4.6 [FreeTextEntry3] : 2.5 [de-identified] : serous  [FreeTextEntry4] : 0.1 [de-identified] : stage 2 [de-identified] : 75% [FreeTextEntry5] : medi honey  [de-identified] : medi honey, foam dressing  [de-identified] : Mechanically cleansed with 0.9% Normal saline and 4 x 4 sterile gauze.  apply medi honey to wound bed cover with foam dressing daily and when soiled.  [TWNoteComboBox5] : No [TWNoteComboBox4] : Small [TWNoteComboBox6] : Pressure [de-identified] : No [de-identified] : None [de-identified] : Erythema [de-identified] : None [de-identified] : Yes [de-identified] : <20% [TWNoteComboBox7] : Autolytic [de-identified] : Primary Dressing [de-identified] : Daily

## 2024-03-22 NOTE — HISTORY OF PRESENT ILLNESS
[FreeTextEntry1] : Patient with Down Syndrome, has been spending more time in bed, one week ago was found to have sacral ulcer, was seen at urgent care and antibiotic ointment was prescribed, patient is here for further care.

## 2024-03-25 ENCOUNTER — TRANSCRIPTION ENCOUNTER (OUTPATIENT)
Age: 39
End: 2024-03-25

## 2024-03-28 ENCOUNTER — NON-APPOINTMENT (OUTPATIENT)
Age: 39
End: 2024-03-28

## 2024-04-05 ENCOUNTER — OUTPATIENT (OUTPATIENT)
Dept: OUTPATIENT SERVICES | Facility: HOSPITAL | Age: 39
LOS: 1 days | Discharge: HOME | End: 2024-04-05
Payer: MEDICARE

## 2024-04-05 ENCOUNTER — APPOINTMENT (OUTPATIENT)
Dept: WOUND CARE | Facility: HOSPITAL | Age: 39
End: 2024-04-05
Payer: MEDICARE

## 2024-04-05 VITALS
HEIGHT: 64 IN | BODY MASS INDEX: 18.1 KG/M2 | WEIGHT: 106 LBS | RESPIRATION RATE: 18 BRPM | OXYGEN SATURATION: 96 % | DIASTOLIC BLOOD PRESSURE: 50 MMHG | TEMPERATURE: 97.8 F | HEART RATE: 63 BPM | SYSTOLIC BLOOD PRESSURE: 81 MMHG

## 2024-04-05 DIAGNOSIS — Z82.49 FAMILY HISTORY OF ISCHEMIC HEART DISEASE AND OTHER DISEASES OF THE CIRCULATORY SYSTEM: ICD-10-CM

## 2024-04-05 DIAGNOSIS — Z98.890 OTHER SPECIFIED POSTPROCEDURAL STATES: ICD-10-CM

## 2024-04-05 DIAGNOSIS — Q90.9 DOWN SYNDROME, UNSPECIFIED: ICD-10-CM

## 2024-04-05 DIAGNOSIS — L89.152 PRESSURE ULCER OF SACRAL REGION, STAGE 2: ICD-10-CM

## 2024-04-05 DIAGNOSIS — L89.150 PRESSURE ULCER OF SACRAL REGION, UNSTAGEABLE: ICD-10-CM

## 2024-04-05 DIAGNOSIS — E03.9 HYPOTHYROIDISM, UNSPECIFIED: ICD-10-CM

## 2024-04-05 DIAGNOSIS — Z83.2 FAMILY HISTORY OF DISEASES OF THE BLOOD AND BLOOD-FORMING ORGANS AND CERTAIN DISORDERS INVOLVING THE IMMUNE MECHANISM: ICD-10-CM

## 2024-04-05 DIAGNOSIS — G47.33 OBSTRUCTIVE SLEEP APNEA (ADULT) (PEDIATRIC): ICD-10-CM

## 2024-04-05 PROCEDURE — 99213 OFFICE O/P EST LOW 20 MIN: CPT

## 2024-04-05 PROCEDURE — G0463: CPT

## 2024-04-05 NOTE — HISTORY OF PRESENT ILLNESS
[FreeTextEntry1] : Patient with Down Syndrome, has been spending more time in bed, one week ago was found to have sacral ulcer, was seen at urgent care and antibiotic ointment was prescribed, patient is here for further care.  4/5/24 sacral ulcer significantly improved. No SOI

## 2024-04-05 NOTE — ASSESSMENT
[Verbal] : Verbal [Patient] : Patient [Family member] : Family member [Caregiver] : Caregiver [Good - alert, interested, motivated] : Good - alert, interested, motivated [Other: ____] : [unfilled] [Dressing changes] : dressing changes [Skin Care] : skin care [Pressure relief] : pressure relief [Signs and symptoms of infection] : sign and symptoms of infection [Nutrition] : nutrition [How and When to Call] : how and when to call [Pain Management] : pain management [Home Health] : home health [Patient responsibility to plan of care] : patient responsibility to plan of care [] : Yes [Stable] : stable [Home] : Home [Ambulatory] : Ambulatory [Not Applicable - Long Term Care/Home Health Agency] : Long Term Care/Home Health Agency: Not Applicable [Demo] : Demo [FreeTextEntry2] : Infection prevention Wound care (dressing changes) Maintain optimal skin integrity to high pressure areas Nutrition and wound healing, discussed protein intake and vitamin supplements to assist with healing. Patient is gluten free, advised mother to ensure all dietary restrictions were met. Patients mother reports that the Ensure protein shakes had too much sodium in it and was causing swelling of lower legs, they are increasing protein through his daily diet. Pressure relief/ Pressure redistribution, mother reports that the patient is standing more often and offloading pressure on sacral area. Offloading the stress on skin structures and decreasing potential pathologic biomechanical influences.  [FreeTextEntry4] : F/U 2 weeks. Received wound care supplies, mother performs dressing changes at home. Continue use of Medihoney to wound base and liquid barrier film to periwound skin.

## 2024-04-05 NOTE — PLAN
[FreeTextEntry1] : Emanuel Huffman, offloading,to sacral ulcer  return to office in two weeks .25 minutes spent for patient care and medical decision making.

## 2024-04-05 NOTE — PHYSICAL EXAM
No [Normal Breath Sounds] : Normal breath sounds [Normal Heart Sounds] : normal heart sounds [Alert] : alert [Oriented to Person] : oriented to person [Calm] : calm [de-identified] : Well-developed, Well-nourished in no acute distress. [de-identified] : Within normal limits. [de-identified] : Within normal limits. [de-identified] : Within normal limits. [de-identified] : Stage II sacral pressure ulcer, base is red and viable, no acute infection, periwound skin is intact with no cellulitis.  [FreeTextEntry1] : Right upper buttock/ coccyx fold  [FreeTextEntry2] : 0.5cm [FreeTextEntry3] : 1.5cm [FreeTextEntry4] : 0.1cm [de-identified] : serous  [de-identified] : stage 2 [de-identified] : re-epithelialization with blanchable erythema of the new re-epithlialized skin [de-identified] : 50% [FreeTextEntry5] : medi honey  [de-identified] : medi honey, foam dressing  [de-identified] : Mechanically cleansed with 0.9% Normal saline and 4 x 4 sterile gauze.   Liquid barrier film applied to periwound skin. [TWNoteComboBox4] : Small [TWNoteComboBox5] : No [TWNoteComboBox6] : Pressure [de-identified] : No [de-identified] : Erythema [de-identified] : None [de-identified] : None [de-identified] : 50% [de-identified] : Yes [TWNoteComboBox7] : Autolytic [de-identified] : Daily [de-identified] : Primary Dressing Yes

## 2024-04-05 NOTE — VITALS
[Pain related to present condition?] : The patient's  pain is not related to present condition. [] : No [de-identified] : Patient does not grimace with wound care anymore, unable to provide numerical scale.

## 2024-04-19 ENCOUNTER — APPOINTMENT (OUTPATIENT)
Dept: WOUND CARE | Facility: HOSPITAL | Age: 39
End: 2024-04-19
Payer: MEDICARE

## 2024-04-19 ENCOUNTER — OUTPATIENT (OUTPATIENT)
Dept: OUTPATIENT SERVICES | Facility: HOSPITAL | Age: 39
LOS: 1 days | Discharge: HOME | End: 2024-04-19
Payer: MEDICARE

## 2024-04-19 VITALS
HEART RATE: 58 BPM | DIASTOLIC BLOOD PRESSURE: 58 MMHG | HEIGHT: 64 IN | OXYGEN SATURATION: 99 % | WEIGHT: 106 LBS | TEMPERATURE: 98.1 F | SYSTOLIC BLOOD PRESSURE: 93 MMHG | RESPIRATION RATE: 18 BRPM | BODY MASS INDEX: 18.1 KG/M2

## 2024-04-19 DIAGNOSIS — Z82.49 FAMILY HISTORY OF ISCHEMIC HEART DISEASE AND OTHER DISEASES OF THE CIRCULATORY SYSTEM: ICD-10-CM

## 2024-04-19 DIAGNOSIS — G47.33 OBSTRUCTIVE SLEEP APNEA (ADULT) (PEDIATRIC): ICD-10-CM

## 2024-04-19 DIAGNOSIS — Z98.890 OTHER SPECIFIED POSTPROCEDURAL STATES: ICD-10-CM

## 2024-04-19 DIAGNOSIS — L89.152 PRESSURE ULCER OF SACRAL REGION, STAGE 2: ICD-10-CM

## 2024-04-19 DIAGNOSIS — L97.801 NON-PRESSURE CHRONIC ULCER OF OTHER PART OF UNSPECIFIED LOWER LEG LIMITED TO BREAKDOWN OF SKIN: ICD-10-CM

## 2024-04-19 DIAGNOSIS — E03.9 HYPOTHYROIDISM, UNSPECIFIED: ICD-10-CM

## 2024-04-19 DIAGNOSIS — Q90.9 DOWN SYNDROME, UNSPECIFIED: ICD-10-CM

## 2024-04-19 DIAGNOSIS — Z83.2 FAMILY HISTORY OF DISEASES OF THE BLOOD AND BLOOD-FORMING ORGANS AND CERTAIN DISORDERS INVOLVING THE IMMUNE MECHANISM: ICD-10-CM

## 2024-04-19 PROCEDURE — G0463: CPT

## 2024-04-19 PROCEDURE — 99213 OFFICE O/P EST LOW 20 MIN: CPT

## 2024-04-30 ENCOUNTER — NON-APPOINTMENT (OUTPATIENT)
Age: 39
End: 2024-04-30

## 2024-05-21 ENCOUNTER — OUTPATIENT (OUTPATIENT)
Dept: OUTPATIENT SERVICES | Facility: HOSPITAL | Age: 39
LOS: 1 days | Discharge: HOME | End: 2024-05-21
Payer: MEDICARE

## 2024-05-21 ENCOUNTER — APPOINTMENT (OUTPATIENT)
Dept: WOUND CARE | Facility: HOSPITAL | Age: 39
End: 2024-05-21
Payer: MEDICARE

## 2024-05-21 VITALS
RESPIRATION RATE: 16 BRPM | WEIGHT: 106 LBS | BODY MASS INDEX: 18.1 KG/M2 | TEMPERATURE: 98.3 F | DIASTOLIC BLOOD PRESSURE: 61 MMHG | OXYGEN SATURATION: 98 % | SYSTOLIC BLOOD PRESSURE: 91 MMHG | HEART RATE: 65 BPM | HEIGHT: 64 IN

## 2024-05-21 DIAGNOSIS — L89.152 PRESSURE ULCER OF SACRAL REGION, STAGE 2: ICD-10-CM

## 2024-05-21 DIAGNOSIS — Z82.49 FAMILY HISTORY OF ISCHEMIC HEART DISEASE AND OTHER DISEASES OF THE CIRCULATORY SYSTEM: ICD-10-CM

## 2024-05-21 DIAGNOSIS — Z98.890 OTHER SPECIFIED POSTPROCEDURAL STATES: ICD-10-CM

## 2024-05-21 DIAGNOSIS — E03.9 HYPOTHYROIDISM, UNSPECIFIED: ICD-10-CM

## 2024-05-21 DIAGNOSIS — Q90.9 DOWN SYNDROME, UNSPECIFIED: ICD-10-CM

## 2024-05-21 DIAGNOSIS — Z83.2 FAMILY HISTORY OF DISEASES OF THE BLOOD AND BLOOD-FORMING ORGANS AND CERTAIN DISORDERS INVOLVING THE IMMUNE MECHANISM: ICD-10-CM

## 2024-05-21 DIAGNOSIS — G47.33 OBSTRUCTIVE SLEEP APNEA (ADULT) (PEDIATRIC): ICD-10-CM

## 2024-05-21 PROCEDURE — 99213 OFFICE O/P EST LOW 20 MIN: CPT

## 2024-05-21 PROCEDURE — G0463: CPT

## 2024-05-21 NOTE — HISTORY OF PRESENT ILLNESS
[FreeTextEntry1] : 39 yo WM, with Down's Syndrome, here for f/u of sacral pressure ulcer. Here with his mother. Reminded them of importance of increase standing/ambulation throughtout the day and night.

## 2024-05-21 NOTE — PHYSICAL EXAM
[Normal Thyroid] : the thyroid was normal [Normal Breath Sounds] : Normal breath sounds [Normal Heart Sounds] : normal heart sounds [Normal Rate and Rhythm] : normal rate and rhythm [Alert] : alert [Calm] : calm [JVD] : no jugular venous distention  [Abdomen Masses] : No abdominal massess [Abdomen Tenderness] : ~T ~M No abdominal tenderness [Tender] : nontender [Enlarged] : not enlarged [de-identified] : adult WM, NAD, alert. [FreeTextEntry1] : Sacral Area: CLOSED [de-identified] : None [TWNoteComboBox4] : None [TWNoteComboBox5] : No [TWNoteComboBox6] : Pressure [de-identified] : No [de-identified] : other [de-identified] : None [de-identified] : None [de-identified] : None

## 2024-05-21 NOTE — ASSESSMENT
[Verbal] : Verbal [Patient] : Patient [Family member] : Family member [Good - alert, interested, motivated] : Good - alert, interested, motivated [Verbalizes knowledge/Understanding] : Verbalizes knowledge/understanding [Skin Care] : skin care [Pressure relief] : pressure relief [Signs and symptoms of infection] : sign and symptoms of infection [How and When to Call] : how and when to call [Off-loading] : off-loading [Patient responsibility to plan of care] : patient responsibility to plan of care [] : Yes [FreeTextEntry2] : Infection prevention Maintain optimal skin integrity  Maintain acceptable level of pain with use of pharmacological and nonpharmacological interventions Offloading / Pressure relief  [FreeTextEntry4] : Follow up for an assessment in two months No wound care needed at this time.  [Stable] : stable [Home] : Home [Ambulatory] : Ambulatory [Not Applicable - Long Term Care/Home Health Agency] : Long Term Care/Home Health Agency: Not Applicable

## 2024-06-20 ENCOUNTER — NON-APPOINTMENT (OUTPATIENT)
Age: 39
End: 2024-06-20

## 2024-06-21 ENCOUNTER — OUTPATIENT (OUTPATIENT)
Dept: OUTPATIENT SERVICES | Facility: HOSPITAL | Age: 39
LOS: 1 days | End: 2024-06-21

## 2024-06-21 VITALS
WEIGHT: 117.95 LBS | HEART RATE: 70 BPM | OXYGEN SATURATION: 98 % | SYSTOLIC BLOOD PRESSURE: 112 MMHG | HEIGHT: 62.5 IN | TEMPERATURE: 98 F | DIASTOLIC BLOOD PRESSURE: 72 MMHG | RESPIRATION RATE: 18 BRPM

## 2024-06-21 DIAGNOSIS — D61.818 OTHER PANCYTOPENIA: ICD-10-CM

## 2024-06-21 DIAGNOSIS — Z98.890 OTHER SPECIFIED POSTPROCEDURAL STATES: Chronic | ICD-10-CM

## 2024-06-21 DIAGNOSIS — Z87.710 PERSONAL HISTORY OF (CORRECTED) HYPOSPADIAS: Chronic | ICD-10-CM

## 2024-06-21 DIAGNOSIS — Z87.74 PERSONAL HISTORY OF (CORRECTED) CONGENITAL MALFORMATIONS OF HEART AND CIRCULATORY SYSTEM: Chronic | ICD-10-CM

## 2024-06-25 ENCOUNTER — OUTPATIENT (OUTPATIENT)
Dept: OUTPATIENT SERVICES | Facility: HOSPITAL | Age: 39
LOS: 1 days | End: 2024-06-25
Payer: MEDICARE

## 2024-06-25 VITALS
DIASTOLIC BLOOD PRESSURE: 66 MMHG | SYSTOLIC BLOOD PRESSURE: 110 MMHG | OXYGEN SATURATION: 100 % | HEART RATE: 56 BPM | RESPIRATION RATE: 17 BRPM

## 2024-06-25 VITALS
SYSTOLIC BLOOD PRESSURE: 88 MMHG | OXYGEN SATURATION: 100 % | RESPIRATION RATE: 21 BRPM | DIASTOLIC BLOOD PRESSURE: 48 MMHG | TEMPERATURE: 98 F | HEART RATE: 52 BPM

## 2024-06-25 DIAGNOSIS — Z87.710 PERSONAL HISTORY OF (CORRECTED) HYPOSPADIAS: Chronic | ICD-10-CM

## 2024-06-25 DIAGNOSIS — Z87.74 PERSONAL HISTORY OF (CORRECTED) CONGENITAL MALFORMATIONS OF HEART AND CIRCULATORY SYSTEM: Chronic | ICD-10-CM

## 2024-06-25 DIAGNOSIS — Z98.890 OTHER SPECIFIED POSTPROCEDURAL STATES: Chronic | ICD-10-CM

## 2024-06-25 DIAGNOSIS — D61.818 OTHER PANCYTOPENIA: ICD-10-CM

## 2024-06-25 PROCEDURE — 88305 TISSUE EXAM BY PATHOLOGIST: CPT | Mod: 26

## 2024-06-25 PROCEDURE — 88365 INSITU HYBRIDIZATION (FISH): CPT | Mod: 26

## 2024-06-25 PROCEDURE — 88342 IMHCHEM/IMCYTCHM 1ST ANTB: CPT | Mod: 26

## 2024-06-25 PROCEDURE — 77012 CT SCAN FOR NEEDLE BIOPSY: CPT | Mod: 26

## 2024-06-25 PROCEDURE — 88364 INSITU HYBRIDIZATION (FISH): CPT | Mod: 26

## 2024-06-25 PROCEDURE — 88291 CYTO/MOLECULAR REPORT: CPT

## 2024-06-25 PROCEDURE — 88341 IMHCHEM/IMCYTCHM EA ADD ANTB: CPT | Mod: 26

## 2024-06-25 PROCEDURE — 88313 SPECIAL STAINS GROUP 2: CPT | Mod: 26

## 2024-06-25 PROCEDURE — 38222 DX BONE MARROW BX & ASPIR: CPT | Mod: RT

## 2024-06-25 PROCEDURE — 88189 FLOWCYTOMETRY/READ 16 & >: CPT | Mod: 59

## 2024-06-25 PROCEDURE — 85097 BONE MARROW INTERPRETATION: CPT

## 2024-06-25 RX ORDER — LEVOTHYROXINE SODIUM 25 MCG
1 TABLET ORAL
Refills: 0 | DISCHARGE

## 2024-06-27 LAB
BCR/ABL BY RT - PCR QUANTITATIVE: SIGNIFICANT CHANGE UP
DNA PLOIDY SPEC FC-IMP: SIGNIFICANT CHANGE UP
DNA PLOIDY SPEC FC-IMP: SIGNIFICANT CHANGE UP
TM INTERPRETATION: SIGNIFICANT CHANGE UP

## 2024-06-28 LAB — JAK2 P.V617F BLD/T QL: SIGNIFICANT CHANGE UP

## 2024-06-29 DIAGNOSIS — D61.818 OTHER PANCYTOPENIA: ICD-10-CM

## 2024-07-02 LAB — ONKOSIGHT MYELOID SEQUENCE: NORMAL — SIGNIFICANT CHANGE UP

## 2024-07-09 LAB — HEMATOPATHOLOGY REPORT: SIGNIFICANT CHANGE UP

## 2024-07-10 LAB — CHROM ANALY INTERPHASE BLD FISH-IMP: SIGNIFICANT CHANGE UP

## 2024-07-12 LAB — CHROM ANALY OVERALL INTERP SPEC-IMP: SIGNIFICANT CHANGE UP

## 2024-08-02 PROBLEM — E03.9 HYPOTHYROIDISM, UNSPECIFIED: Chronic | Status: ACTIVE | Noted: 2024-06-21

## 2024-08-02 PROBLEM — I34.0 NONRHEUMATIC MITRAL (VALVE) INSUFFICIENCY: Chronic | Status: ACTIVE | Noted: 2024-06-21

## 2024-08-02 PROBLEM — Q21.20 ATRIOVENTRICULAR SEPTAL DEFECT, UNSPECIFIED AS TO PARTIAL OR COMPLETE: Chronic | Status: ACTIVE | Noted: 2024-06-21

## 2024-08-02 PROBLEM — D70.9 NEUTROPENIA, UNSPECIFIED: Chronic | Status: ACTIVE | Noted: 2024-06-21

## 2024-08-02 PROBLEM — D69.6 THROMBOCYTOPENIA, UNSPECIFIED: Chronic | Status: ACTIVE | Noted: 2024-06-21

## 2024-08-02 PROBLEM — K90.0 CELIAC DISEASE: Chronic | Status: ACTIVE | Noted: 2024-06-21

## 2024-08-02 PROBLEM — D75.9 DISEASE OF BLOOD AND BLOOD-FORMING ORGANS, UNSPECIFIED: Chronic | Status: ACTIVE | Noted: 2024-06-21

## 2024-08-02 PROBLEM — Q90.9 DOWN SYNDROME, UNSPECIFIED: Chronic | Status: ACTIVE | Noted: 2024-06-21

## 2024-08-13 ENCOUNTER — APPOINTMENT (OUTPATIENT)
Dept: WOUND CARE | Facility: HOSPITAL | Age: 39
End: 2024-08-13

## 2024-08-19 ENCOUNTER — OUTPATIENT (OUTPATIENT)
Dept: OUTPATIENT SERVICES | Facility: HOSPITAL | Age: 39
LOS: 1 days | Discharge: HOME | End: 2024-08-19
Payer: MEDICARE

## 2024-08-19 ENCOUNTER — APPOINTMENT (OUTPATIENT)
Dept: WOUND CARE | Facility: HOSPITAL | Age: 39
End: 2024-08-19
Payer: MEDICARE

## 2024-08-19 VITALS
WEIGHT: 115 LBS | HEART RATE: 63 BPM | TEMPERATURE: 98.3 F | BODY MASS INDEX: 19.63 KG/M2 | OXYGEN SATURATION: 96 % | DIASTOLIC BLOOD PRESSURE: 62 MMHG | HEIGHT: 64 IN | RESPIRATION RATE: 15 BRPM | SYSTOLIC BLOOD PRESSURE: 99 MMHG

## 2024-08-19 DIAGNOSIS — Z87.74 PERSONAL HISTORY OF (CORRECTED) CONGENITAL MALFORMATIONS OF HEART AND CIRCULATORY SYSTEM: Chronic | ICD-10-CM

## 2024-08-19 DIAGNOSIS — L89.152 PRESSURE ULCER OF SACRAL REGION, STAGE 2: ICD-10-CM

## 2024-08-19 DIAGNOSIS — Z98.890 OTHER SPECIFIED POSTPROCEDURAL STATES: Chronic | ICD-10-CM

## 2024-08-19 DIAGNOSIS — Z87.710 PERSONAL HISTORY OF (CORRECTED) HYPOSPADIAS: Chronic | ICD-10-CM

## 2024-08-19 PROCEDURE — 99213 OFFICE O/P EST LOW 20 MIN: CPT

## 2024-08-19 PROCEDURE — G0463: CPT

## 2024-08-19 NOTE — PHYSICAL EXAM
[Normal Breath Sounds] : Normal breath sounds [Normal Heart Sounds] : normal heart sounds [Alert] : alert [Oriented to Person] : oriented to person [Oriented to Place] : oriented to place [Oriented to Time] : oriented to time [Calm] : calm [de-identified] : Well-developed, Well-nourished in no acute distress. [de-identified] : Within normal limits. [de-identified] : Within normal limits. [de-identified] : Within normal limits. [de-identified] : Sacral pressure ulcer has fully healed, no drainage, no odor, no acute infection. [FreeTextEntry1] : Sacral Area: CLOSED [de-identified] : None [TWNoteComboBox4] : None [TWNoteComboBox5] : No [TWNoteComboBox6] : Pressure [de-identified] : No [de-identified] : other [de-identified] : None [de-identified] : None [de-identified] : None

## 2024-08-19 NOTE — PHYSICAL EXAM
[Normal Breath Sounds] : Normal breath sounds [Normal Heart Sounds] : normal heart sounds [Alert] : alert [Oriented to Person] : oriented to person [Oriented to Place] : oriented to place [Oriented to Time] : oriented to time [Calm] : calm [de-identified] : Well-developed, Well-nourished in no acute distress. [de-identified] : Within normal limits. [de-identified] : Within normal limits. [de-identified] : Within normal limits. [de-identified] : Sacral pressure ulcer has fully healed, no drainage, no odor, no acute infection. [FreeTextEntry1] : Sacral Area: CLOSED [de-identified] : None [TWNoteComboBox4] : None [TWNoteComboBox5] : No [TWNoteComboBox6] : Pressure [de-identified] : No [de-identified] : other [de-identified] : None [de-identified] : None [de-identified] : None

## 2024-08-19 NOTE — ASSESSMENT
[Verbal] : Verbal [Patient] : Patient [Family member] : Family member [Good - alert, interested, motivated] : Good - alert, interested, motivated [Verbalizes knowledge/Understanding] : Verbalizes knowledge/understanding [Skin Care] : skin care [Pressure relief] : pressure relief [Signs and symptoms of infection] : sign and symptoms of infection [How and When to Call] : how and when to call [Off-loading] : off-loading [Patient responsibility to plan of care] : patient responsibility to plan of care [] : Yes [Stable] : stable [Home] : Home [Ambulatory] : Ambulatory [Not Applicable - Long Term Care/Home Health Agency] : Long Term Care/Home Health Agency: Not Applicable [FreeTextEntry2] : Infection prevention s/s of infection Maintain optimal skin integrity  Maintain acceptable level of pain with use of pharmacological and nonpharmacological interventions Offloading / Pressure relief  [FreeTextEntry4] : Return to the [facility] if any pressure injuries are noted. Pt discharged from c [FreeTextEntry1] : Sacral pressure ulcer has fully healed.

## 2024-08-20 DIAGNOSIS — L89.152 PRESSURE ULCER OF SACRAL REGION, STAGE 2: ICD-10-CM

## 2024-08-20 DIAGNOSIS — Q90.9 DOWN SYNDROME, UNSPECIFIED: ICD-10-CM

## 2024-08-20 DIAGNOSIS — Z83.2 FAMILY HISTORY OF DISEASES OF THE BLOOD AND BLOOD-FORMING ORGANS AND CERTAIN DISORDERS INVOLVING THE IMMUNE MECHANISM: ICD-10-CM

## 2024-08-20 DIAGNOSIS — G47.33 OBSTRUCTIVE SLEEP APNEA (ADULT) (PEDIATRIC): ICD-10-CM

## 2024-08-20 DIAGNOSIS — E03.9 HYPOTHYROIDISM, UNSPECIFIED: ICD-10-CM

## 2024-08-20 DIAGNOSIS — Z98.890 OTHER SPECIFIED POSTPROCEDURAL STATES: ICD-10-CM

## 2024-08-20 DIAGNOSIS — Z82.49 FAMILY HISTORY OF ISCHEMIC HEART DISEASE AND OTHER DISEASES OF THE CIRCULATORY SYSTEM: ICD-10-CM

## 2024-08-20 DIAGNOSIS — Z79.899 OTHER LONG TERM (CURRENT) DRUG THERAPY: ICD-10-CM

## 2025-01-23 ENCOUNTER — RESULT CHARGE (OUTPATIENT)
Age: 40
End: 2025-01-23

## 2025-01-23 DIAGNOSIS — Z98.890 OTHER SPECIFIED POSTPROCEDURAL STATES: ICD-10-CM

## 2025-01-23 DIAGNOSIS — I05.9 RHEUMATIC MITRAL VALVE DISEASE, UNSPECIFIED: ICD-10-CM

## 2025-01-23 DIAGNOSIS — Q21.0 VENTRICULAR SEPTAL DEFECT: ICD-10-CM

## 2025-01-23 DIAGNOSIS — Q21.23 COMPLETE ATRIOVENTRICULAR SEPTAL DEFECT: ICD-10-CM

## 2025-01-27 ENCOUNTER — APPOINTMENT (OUTPATIENT)
Dept: PEDIATRIC CARDIOLOGY | Facility: CLINIC | Age: 40
End: 2025-01-27
Payer: MEDICARE

## 2025-01-27 VITALS — OXYGEN SATURATION: 97 % | SYSTOLIC BLOOD PRESSURE: 104 MMHG | HEART RATE: 62 BPM | DIASTOLIC BLOOD PRESSURE: 67 MMHG

## 2025-01-27 PROCEDURE — 99214 OFFICE O/P EST MOD 30 MIN: CPT

## 2025-01-27 PROCEDURE — G2211 COMPLEX E/M VISIT ADD ON: CPT

## 2025-01-27 PROCEDURE — 93303 ECHO TRANSTHORACIC: CPT

## 2025-01-27 PROCEDURE — 93325 DOPPLER ECHO COLOR FLOW MAPG: CPT

## 2025-01-27 PROCEDURE — 93320 DOPPLER ECHO COMPLETE: CPT

## 2025-08-17 ENCOUNTER — NON-APPOINTMENT (OUTPATIENT)
Age: 40
End: 2025-08-17